# Patient Record
Sex: FEMALE | URBAN - METROPOLITAN AREA
[De-identification: names, ages, dates, MRNs, and addresses within clinical notes are randomized per-mention and may not be internally consistent; named-entity substitution may affect disease eponyms.]

---

## 2019-04-29 ENCOUNTER — DOCUMENTATION ONLY (OUTPATIENT)
Dept: HEMATOLOGY/ONCOLOGY | Facility: CLINIC | Age: 49
End: 2019-04-29

## 2019-04-29 NOTE — PROGRESS NOTES
[facility] Tumor Board :  04/26/2019    48 y.o. female ; incidentally noted soft tissue AL amyloid   on breast biopsy done for abnormal MMG; she has no signs of other organ involvement with extensive testing done including bone marrow biopsy, urine studies showing no monoclonal protein or plasma cell population required for diagnosis; also a PAN ct scan that was unremarkable .  She has upcoming endoscopies planned though no GI symptoms.  Would fu on random biopsies.   Recommend obtain TTE and EKG to ensure no cardiac abnormalities and if normal monitor with cbc, cmp, serum free light chains, quantitative immunoglobulins, serum electropheresis, serum immunofixation annually and to see if any other clinical sequelae of amyloid (eg neuropathy, GI) or other organ dysfunction) develop.  She has no breast symptoms.     [place] or consult in [facility] [place] not indicated at this point.    Casper Woody MD  Hematology & Stem Cell Transplant